# Patient Record
Sex: FEMALE | Race: WHITE | NOT HISPANIC OR LATINO | URBAN - METROPOLITAN AREA
[De-identification: names, ages, dates, MRNs, and addresses within clinical notes are randomized per-mention and may not be internally consistent; named-entity substitution may affect disease eponyms.]

---

## 2017-02-13 ENCOUNTER — OFFICE (OUTPATIENT)
Dept: URBAN - METROPOLITAN AREA CLINIC 19 | Facility: CLINIC | Age: 57
End: 2017-02-13

## 2017-02-13 VITALS
SYSTOLIC BLOOD PRESSURE: 124 MMHG | HEIGHT: 67 IN | WEIGHT: 167 LBS | DIASTOLIC BLOOD PRESSURE: 77 MMHG | HEART RATE: 87 BPM

## 2017-02-13 DIAGNOSIS — R10.32 LEFT LOWER QUADRANT PAIN: ICD-10-CM

## 2017-02-13 DIAGNOSIS — R13.10 DYSPHAGIA, UNSPECIFIED: ICD-10-CM

## 2017-02-13 DIAGNOSIS — R14.0 ABDOMINAL DISTENSION (GASEOUS): ICD-10-CM

## 2017-02-13 DIAGNOSIS — Z80.0 FAMILY HISTORY OF MALIGNANT NEOPLASM OF DIGESTIVE ORGANS: ICD-10-CM

## 2017-02-13 DIAGNOSIS — Z83.71 FAMILY HISTORY OF COLONIC POLYPS: ICD-10-CM

## 2017-02-13 DIAGNOSIS — E66.3 OVERWEIGHT: ICD-10-CM

## 2017-02-13 DIAGNOSIS — Z86.010 PERSONAL HISTORY OF COLONIC POLYPS: ICD-10-CM

## 2017-02-13 DIAGNOSIS — K59.00 CONSTIPATION, UNSPECIFIED: ICD-10-CM

## 2017-02-13 PROCEDURE — 99213 OFFICE O/P EST LOW 20 MIN: CPT | Performed by: INTERNAL MEDICINE

## 2017-02-13 RX ORDER — DOCUSATE SODIUM 100 MG/1
200 CAPSULE ORAL
Qty: 60 | Refills: 11 | Status: COMPLETED
Start: 2017-02-13 | End: 2018-04-06

## 2017-02-13 RX ORDER — LINACLOTIDE 290 UG/1
CAPSULE, GELATIN COATED ORAL
Qty: 90 | Refills: 5 | Status: ACTIVE

## 2017-02-13 NOTE — SERVICEHPINOTES
Patient presented to [facility] on 01/30/17 with severe, debilitating LLQ pain. She was treated with Dilaudid, evaluated and discharged with constipation diagnosis. KUB waas normal. Labs: CBC and CMP, normal. Glucose level: 140. She was discharged with Colace twice x7 days and asked to decrease to once per day after.  Her symptoms began 6-8 weeks ago with worsening episodes of LLQ pain and vomiting. Typically when she has had pain in the past it has always been LUQ of abdomen.  She has a long history of chronic constipation. Currently she is taking Colace once daily and Benefiber in her coffee each morning. Colace twice daily was effective. Miralax in past caused abd pain, increased gas, and diarrhea. Linzess in past did not work.  She has been experiencing dysphagia with pills and foods that tend to hesitate when going down. Feels like her throat is full an tends to happen prior to onset of LLQ pain and vomiting.   Denies any heartburn/reflux symptoms. BR

## 2017-03-30 ENCOUNTER — AMBULATORY SURGICAL CENTER (OUTPATIENT)
Dept: URBAN - METROPOLITAN AREA SURGERY 3 | Facility: SURGERY | Age: 57
End: 2017-03-30

## 2017-03-30 ENCOUNTER — OFFICE (OUTPATIENT)
Dept: URBAN - METROPOLITAN AREA CLINIC 11 | Facility: CLINIC | Age: 57
End: 2017-03-30

## 2017-03-30 VITALS
RESPIRATION RATE: 17 BRPM | OXYGEN SATURATION: 97 % | HEIGHT: 67 IN | RESPIRATION RATE: 22 BRPM | HEIGHT: 67 IN | SYSTOLIC BLOOD PRESSURE: 115 MMHG | SYSTOLIC BLOOD PRESSURE: 115 MMHG | HEART RATE: 62 BPM | DIASTOLIC BLOOD PRESSURE: 69 MMHG | DIASTOLIC BLOOD PRESSURE: 60 MMHG | DIASTOLIC BLOOD PRESSURE: 69 MMHG | RESPIRATION RATE: 20 BRPM | HEART RATE: 76 BPM | OXYGEN SATURATION: 98 % | SYSTOLIC BLOOD PRESSURE: 120 MMHG | DIASTOLIC BLOOD PRESSURE: 63 MMHG | HEART RATE: 76 BPM | RESPIRATION RATE: 20 BRPM | OXYGEN SATURATION: 97 % | OXYGEN SATURATION: 100 % | HEART RATE: 62 BPM | RESPIRATION RATE: 21 BRPM | TEMPERATURE: 98.1 F | SYSTOLIC BLOOD PRESSURE: 139 MMHG | SYSTOLIC BLOOD PRESSURE: 121 MMHG | TEMPERATURE: 97 F | SYSTOLIC BLOOD PRESSURE: 121 MMHG | DIASTOLIC BLOOD PRESSURE: 75 MMHG | TEMPERATURE: 98.1 F | DIASTOLIC BLOOD PRESSURE: 75 MMHG | RESPIRATION RATE: 22 BRPM | RESPIRATION RATE: 21 BRPM | HEART RATE: 64 BPM | DIASTOLIC BLOOD PRESSURE: 63 MMHG | SYSTOLIC BLOOD PRESSURE: 122 MMHG | SYSTOLIC BLOOD PRESSURE: 139 MMHG | RESPIRATION RATE: 17 BRPM | OXYGEN SATURATION: 98 % | DIASTOLIC BLOOD PRESSURE: 66 MMHG | SYSTOLIC BLOOD PRESSURE: 122 MMHG | HEART RATE: 64 BPM | TEMPERATURE: 97 F | DIASTOLIC BLOOD PRESSURE: 66 MMHG | DIASTOLIC BLOOD PRESSURE: 60 MMHG | WEIGHT: 170 LBS | WEIGHT: 170 LBS | SYSTOLIC BLOOD PRESSURE: 120 MMHG | OXYGEN SATURATION: 100 %

## 2017-03-30 DIAGNOSIS — K29.60 OTHER GASTRITIS WITHOUT BLEEDING: ICD-10-CM

## 2017-03-30 DIAGNOSIS — R13.10 DYSPHAGIA, UNSPECIFIED: ICD-10-CM

## 2017-03-30 PROBLEM — K31.89 OTHER DISEASES OF STOMACH AND DUODENUM: Status: ACTIVE | Noted: 2017-03-30

## 2017-03-30 PROCEDURE — 43239 EGD BIOPSY SINGLE/MULTIPLE: CPT | Performed by: INTERNAL MEDICINE

## 2017-03-30 PROCEDURE — 88305 TISSUE EXAM BY PATHOLOGIST: CPT | Performed by: INTERNAL MEDICINE

## 2017-03-30 PROCEDURE — 88313 SPECIAL STAINS GROUP 2: CPT | Performed by: INTERNAL MEDICINE

## 2017-03-30 PROCEDURE — 43248 EGD GUIDE WIRE INSERTION: CPT | Performed by: INTERNAL MEDICINE

## 2017-03-30 PROCEDURE — 88342 IMHCHEM/IMCYTCHM 1ST ANTB: CPT | Performed by: INTERNAL MEDICINE

## 2018-06-28 ENCOUNTER — AMBULATORY SURGICAL CENTER (OUTPATIENT)
Dept: URBAN - METROPOLITAN AREA SURGERY 3 | Facility: SURGERY | Age: 58
End: 2018-06-28
Payer: COMMERCIAL

## 2018-06-28 ENCOUNTER — OFFICE (OUTPATIENT)
Dept: URBAN - METROPOLITAN AREA PATHOLOGY 22 | Facility: PATHOLOGY | Age: 58
End: 2018-06-28
Payer: COMMERCIAL

## 2018-06-28 VITALS
OXYGEN SATURATION: 97 % | RESPIRATION RATE: 24 BRPM | HEIGHT: 67 IN | DIASTOLIC BLOOD PRESSURE: 73 MMHG | SYSTOLIC BLOOD PRESSURE: 118 MMHG | OXYGEN SATURATION: 97 % | HEART RATE: 64 BPM | DIASTOLIC BLOOD PRESSURE: 80 MMHG | RESPIRATION RATE: 19 BRPM | DIASTOLIC BLOOD PRESSURE: 73 MMHG | SYSTOLIC BLOOD PRESSURE: 118 MMHG | HEART RATE: 64 BPM | OXYGEN SATURATION: 96 % | RESPIRATION RATE: 18 BRPM | DIASTOLIC BLOOD PRESSURE: 80 MMHG | SYSTOLIC BLOOD PRESSURE: 122 MMHG | SYSTOLIC BLOOD PRESSURE: 117 MMHG | WEIGHT: 170 LBS | SYSTOLIC BLOOD PRESSURE: 122 MMHG | DIASTOLIC BLOOD PRESSURE: 65 MMHG | DIASTOLIC BLOOD PRESSURE: 74 MMHG | HEART RATE: 66 BPM | WEIGHT: 170 LBS | RESPIRATION RATE: 19 BRPM | HEART RATE: 69 BPM | SYSTOLIC BLOOD PRESSURE: 134 MMHG | TEMPERATURE: 96.3 F | TEMPERATURE: 98 F | TEMPERATURE: 98 F | SYSTOLIC BLOOD PRESSURE: 134 MMHG | DIASTOLIC BLOOD PRESSURE: 65 MMHG | HEART RATE: 69 BPM | HEART RATE: 66 BPM | RESPIRATION RATE: 18 BRPM | RESPIRATION RATE: 24 BRPM | TEMPERATURE: 96.3 F | HEIGHT: 67 IN | SYSTOLIC BLOOD PRESSURE: 117 MMHG | HEART RATE: 67 BPM | HEART RATE: 67 BPM | OXYGEN SATURATION: 96 % | DIASTOLIC BLOOD PRESSURE: 74 MMHG

## 2018-06-28 DIAGNOSIS — D12.2 BENIGN NEOPLASM OF ASCENDING COLON: ICD-10-CM

## 2018-06-28 DIAGNOSIS — K62.1 RECTAL POLYP: ICD-10-CM

## 2018-06-28 DIAGNOSIS — D12.3 BENIGN NEOPLASM OF TRANSVERSE COLON: ICD-10-CM

## 2018-06-28 DIAGNOSIS — D12.8 BENIGN NEOPLASM OF RECTUM: ICD-10-CM

## 2018-06-28 DIAGNOSIS — Z12.11 ENCOUNTER FOR SCREENING FOR MALIGNANT NEOPLASM OF COLON: ICD-10-CM

## 2018-06-28 DIAGNOSIS — Z83.71 FAMILY HISTORY OF COLONIC POLYPS: ICD-10-CM

## 2018-06-28 DIAGNOSIS — Z80.0 FAMILY HISTORY OF MALIGNANT NEOPLASM OF DIGESTIVE ORGANS: ICD-10-CM

## 2018-06-28 DIAGNOSIS — K64.8 OTHER HEMORRHOIDS: ICD-10-CM

## 2018-06-28 DIAGNOSIS — Z86.010 PERSONAL HISTORY OF COLONIC POLYPS: ICD-10-CM

## 2018-06-28 PROCEDURE — 45385 COLONOSCOPY W/LESION REMOVAL: CPT | Mod: 33 | Performed by: INTERNAL MEDICINE

## 2018-06-28 PROCEDURE — 45380 COLONOSCOPY AND BIOPSY: CPT | Mod: 59 | Performed by: INTERNAL MEDICINE

## 2018-06-28 PROCEDURE — 88305 TISSUE EXAM BY PATHOLOGIST: CPT | Performed by: INTERNAL MEDICINE

## 2018-06-28 PROCEDURE — 88342 IMHCHEM/IMCYTCHM 1ST ANTB: CPT | Performed by: INTERNAL MEDICINE

## 2019-02-11 ENCOUNTER — OFFICE (OUTPATIENT)
Dept: URBAN - METROPOLITAN AREA CLINIC 19 | Facility: CLINIC | Age: 59
End: 2019-02-11

## 2019-02-11 VITALS
SYSTOLIC BLOOD PRESSURE: 124 MMHG | WEIGHT: 183 LBS | DIASTOLIC BLOOD PRESSURE: 66 MMHG | HEIGHT: 67 IN | HEART RATE: 76 BPM

## 2019-02-11 DIAGNOSIS — R10.32 LEFT LOWER QUADRANT PAIN: ICD-10-CM

## 2019-02-11 DIAGNOSIS — K59.00 CONSTIPATION, UNSPECIFIED: ICD-10-CM

## 2019-02-11 LAB
COMP. METABOLIC PANEL (14): A/G RATIO: 1.9 (ref 1.2–2.2)
COMP. METABOLIC PANEL (14): ALBUMIN: 4.7 G/DL (ref 3.6–4.8)
COMP. METABOLIC PANEL (14): ALKALINE PHOSPHATASE: 107 IU/L (ref 39–117)
COMP. METABOLIC PANEL (14): ALT (SGPT): 26 IU/L (ref 0–32)
COMP. METABOLIC PANEL (14): AST (SGOT): 27 IU/L (ref 0–40)
COMP. METABOLIC PANEL (14): BILIRUBIN, TOTAL: <0.2 MG/DL
COMP. METABOLIC PANEL (14): BUN/CREATININE RATIO: 42 — HIGH (ref 12–28)
COMP. METABOLIC PANEL (14): BUN: 28 MG/DL — HIGH (ref 8–27)
COMP. METABOLIC PANEL (14): CALCIUM: 9.8 MG/DL (ref 8.7–10.3)
COMP. METABOLIC PANEL (14): CARBON DIOXIDE, TOTAL: 26 MMOL/L (ref 20–29)
COMP. METABOLIC PANEL (14): CHLORIDE: 103 MMOL/L (ref 96–106)
COMP. METABOLIC PANEL (14): CREATININE: 0.67 MG/DL (ref 0.57–1)
COMP. METABOLIC PANEL (14): EGFR IF AFRICN AM: 110 ML/MIN/1.73 (ref 59–?)
COMP. METABOLIC PANEL (14): EGFR IF NONAFRICN AM: 96 ML/MIN/1.73 (ref 59–?)
COMP. METABOLIC PANEL (14): GLOBULIN, TOTAL: 2.5 G/DL (ref 1.5–4.5)
COMP. METABOLIC PANEL (14): GLUCOSE: 90 MG/DL (ref 65–99)
COMP. METABOLIC PANEL (14): POTASSIUM: 5.1 MMOL/L (ref 3.5–5.2)
COMP. METABOLIC PANEL (14): PROTEIN, TOTAL: 7.2 G/DL (ref 6–8.5)
COMP. METABOLIC PANEL (14): SODIUM: 145 MMOL/L — HIGH (ref 134–144)

## 2019-02-11 PROCEDURE — 99213 OFFICE O/P EST LOW 20 MIN: CPT | Performed by: INTERNAL MEDICINE

## 2019-06-26 PROBLEM — Z86.010 SURVEILLANCE DUE TO PRIOR COLONIC NEOPLASIA: Status: ACTIVE | Noted: 2018-06-28

## 2019-07-18 ENCOUNTER — AMBULATORY SURGICAL CENTER (OUTPATIENT)
Dept: URBAN - METROPOLITAN AREA SURGERY 3 | Facility: SURGERY | Age: 59
End: 2019-07-18

## 2019-07-18 ENCOUNTER — OFFICE (OUTPATIENT)
Dept: URBAN - METROPOLITAN AREA PATHOLOGY 22 | Facility: PATHOLOGY | Age: 59
End: 2019-07-18

## 2019-07-18 VITALS
OXYGEN SATURATION: 94 % | HEART RATE: 57 BPM | OXYGEN SATURATION: 94 % | HEART RATE: 60 BPM | OXYGEN SATURATION: 95 % | TEMPERATURE: 97.2 F | SYSTOLIC BLOOD PRESSURE: 110 MMHG | SYSTOLIC BLOOD PRESSURE: 112 MMHG | SYSTOLIC BLOOD PRESSURE: 139 MMHG | OXYGEN SATURATION: 97 % | SYSTOLIC BLOOD PRESSURE: 106 MMHG | DIASTOLIC BLOOD PRESSURE: 60 MMHG | SYSTOLIC BLOOD PRESSURE: 139 MMHG | DIASTOLIC BLOOD PRESSURE: 83 MMHG | RESPIRATION RATE: 20 BRPM | OXYGEN SATURATION: 92 % | HEART RATE: 63 BPM | WEIGHT: 183 LBS | OXYGEN SATURATION: 95 % | HEART RATE: 63 BPM | RESPIRATION RATE: 19 BRPM | HEART RATE: 65 BPM | SYSTOLIC BLOOD PRESSURE: 112 MMHG | HEART RATE: 60 BPM | TEMPERATURE: 97.9 F | RESPIRATION RATE: 20 BRPM | SYSTOLIC BLOOD PRESSURE: 99 MMHG | DIASTOLIC BLOOD PRESSURE: 83 MMHG | SYSTOLIC BLOOD PRESSURE: 121 MMHG | DIASTOLIC BLOOD PRESSURE: 60 MMHG | OXYGEN SATURATION: 95 % | SYSTOLIC BLOOD PRESSURE: 110 MMHG | RESPIRATION RATE: 16 BRPM | SYSTOLIC BLOOD PRESSURE: 106 MMHG | OXYGEN SATURATION: 98 % | DIASTOLIC BLOOD PRESSURE: 49 MMHG | WEIGHT: 183 LBS | RESPIRATION RATE: 16 BRPM | HEART RATE: 61 BPM | SYSTOLIC BLOOD PRESSURE: 105 MMHG | DIASTOLIC BLOOD PRESSURE: 64 MMHG | TEMPERATURE: 97.2 F | SYSTOLIC BLOOD PRESSURE: 99 MMHG | HEART RATE: 61 BPM | HEART RATE: 65 BPM | OXYGEN SATURATION: 97 % | RESPIRATION RATE: 20 BRPM | WEIGHT: 183 LBS | OXYGEN SATURATION: 94 % | HEART RATE: 59 BPM | SYSTOLIC BLOOD PRESSURE: 112 MMHG | DIASTOLIC BLOOD PRESSURE: 49 MMHG | SYSTOLIC BLOOD PRESSURE: 106 MMHG | SYSTOLIC BLOOD PRESSURE: 121 MMHG | DIASTOLIC BLOOD PRESSURE: 72 MMHG | OXYGEN SATURATION: 98 % | DIASTOLIC BLOOD PRESSURE: 83 MMHG | HEART RATE: 57 BPM | SYSTOLIC BLOOD PRESSURE: 105 MMHG | DIASTOLIC BLOOD PRESSURE: 72 MMHG | OXYGEN SATURATION: 98 % | DIASTOLIC BLOOD PRESSURE: 72 MMHG | SYSTOLIC BLOOD PRESSURE: 121 MMHG | HEART RATE: 57 BPM | SYSTOLIC BLOOD PRESSURE: 110 MMHG | HEART RATE: 61 BPM | OXYGEN SATURATION: 97 % | SYSTOLIC BLOOD PRESSURE: 139 MMHG | TEMPERATURE: 97.9 F | DIASTOLIC BLOOD PRESSURE: 64 MMHG | RESPIRATION RATE: 18 BRPM | HEIGHT: 67 IN | RESPIRATION RATE: 16 BRPM | RESPIRATION RATE: 18 BRPM | HEIGHT: 67 IN | OXYGEN SATURATION: 96 % | RESPIRATION RATE: 18 BRPM | HEIGHT: 67 IN | DIASTOLIC BLOOD PRESSURE: 49 MMHG | RESPIRATION RATE: 19 BRPM | RESPIRATION RATE: 19 BRPM | OXYGEN SATURATION: 96 % | HEART RATE: 59 BPM | OXYGEN SATURATION: 96 % | OXYGEN SATURATION: 92 % | SYSTOLIC BLOOD PRESSURE: 105 MMHG | HEART RATE: 59 BPM | TEMPERATURE: 97.2 F | DIASTOLIC BLOOD PRESSURE: 60 MMHG | HEART RATE: 60 BPM | SYSTOLIC BLOOD PRESSURE: 99 MMHG | DIASTOLIC BLOOD PRESSURE: 64 MMHG | OXYGEN SATURATION: 92 % | TEMPERATURE: 97.9 F | HEART RATE: 63 BPM | HEART RATE: 65 BPM

## 2019-07-18 DIAGNOSIS — Z86.010 PERSONAL HISTORY OF COLONIC POLYPS: ICD-10-CM

## 2019-07-18 DIAGNOSIS — D12.4 BENIGN NEOPLASM OF DESCENDING COLON: ICD-10-CM

## 2019-07-18 DIAGNOSIS — K64.0 FIRST DEGREE HEMORRHOIDS: ICD-10-CM

## 2019-07-18 PROCEDURE — 45380 COLONOSCOPY AND BIOPSY: CPT | Performed by: INTERNAL MEDICINE

## 2019-07-18 PROCEDURE — 88305 TISSUE EXAM BY PATHOLOGIST: CPT | Performed by: INTERNAL MEDICINE

## 2019-11-01 ENCOUNTER — OFFICE (OUTPATIENT)
Dept: URBAN - METROPOLITAN AREA CLINIC 19 | Facility: CLINIC | Age: 59
End: 2019-11-01
Payer: COMMERCIAL

## 2019-11-01 VITALS
HEIGHT: 67 IN | HEART RATE: 75 BPM | DIASTOLIC BLOOD PRESSURE: 62 MMHG | SYSTOLIC BLOOD PRESSURE: 116 MMHG | WEIGHT: 184 LBS

## 2019-11-01 DIAGNOSIS — K59.00 CONSTIPATION, UNSPECIFIED: ICD-10-CM

## 2019-11-01 DIAGNOSIS — K64.8 OTHER HEMORRHOIDS: ICD-10-CM

## 2019-11-01 PROCEDURE — 99213 OFFICE O/P EST LOW 20 MIN: CPT | Performed by: INTERNAL MEDICINE

## 2020-05-11 ENCOUNTER — TELEHEALTH PROVIDED OTHER THAN IN PATIENT'S HOME (OUTPATIENT)
Dept: URBAN - METROPOLITAN AREA TELEHEALTH 10 | Facility: TELEHEALTH | Age: 60
End: 2020-05-11

## 2020-05-11 VITALS — HEIGHT: 67 IN

## 2020-05-11 DIAGNOSIS — K59.00 CONSTIPATION, UNSPECIFIED: ICD-10-CM

## 2020-05-11 NOTE — SERVICEHPINOTES
Patient is a 59 year old  woman who presents for telehealth follow-up.  She does not have any major complaints report at this time.  She reports that she is in a bit of a depression, as she lost her fiancé in January of this year.  Patient reports that she has been prescribed a folate to help with depression, and that no other antidepressants can be currently combined with the medications that she is on. Patient is doing well with Linzess 290 mcg daily, and feels this medication is working well for her.  She does not have any new GI symptoms report no new family history, or any new medical or surgical history.

## 2022-03-04 ENCOUNTER — OFFICE (OUTPATIENT)
Dept: URBAN - METROPOLITAN AREA CLINIC 19 | Facility: CLINIC | Age: 62
End: 2022-03-04

## 2022-03-04 VITALS
HEART RATE: 76 BPM | HEIGHT: 67 IN | DIASTOLIC BLOOD PRESSURE: 80 MMHG | SYSTOLIC BLOOD PRESSURE: 158 MMHG | RESPIRATION RATE: 18 BRPM | OXYGEN SATURATION: 100 % | WEIGHT: 184 LBS

## 2022-03-04 DIAGNOSIS — K59.00 CONSTIPATION, UNSPECIFIED: ICD-10-CM

## 2022-03-04 DIAGNOSIS — R13.10 DYSPHAGIA, UNSPECIFIED: ICD-10-CM

## 2022-03-04 DIAGNOSIS — R11.0 NAUSEA: ICD-10-CM

## 2022-03-04 DIAGNOSIS — R10.9 UNSPECIFIED ABDOMINAL PAIN: ICD-10-CM

## 2022-03-04 DIAGNOSIS — R14.2 ERUCTATION: ICD-10-CM

## 2022-03-04 PROCEDURE — 99214 OFFICE O/P EST MOD 30 MIN: CPT | Performed by: INTERNAL MEDICINE

## 2022-04-15 ENCOUNTER — AMBULATORY SURGICAL CENTER (OUTPATIENT)
Dept: URBAN - METROPOLITAN AREA SURGERY 3 | Facility: SURGERY | Age: 62
End: 2022-04-15

## 2022-04-15 ENCOUNTER — AMBULATORY SURGICAL CENTER (OUTPATIENT)
Dept: URBAN - METROPOLITAN AREA SURGERY 3 | Facility: SURGERY | Age: 62
End: 2022-04-15
Payer: COMMERCIAL

## 2022-04-15 ENCOUNTER — OFFICE (OUTPATIENT)
Dept: URBAN - METROPOLITAN AREA PATHOLOGY 22 | Facility: PATHOLOGY | Age: 62
End: 2022-04-15
Payer: COMMERCIAL

## 2022-04-15 VITALS
WEIGHT: 175 LBS | OXYGEN SATURATION: 95 % | TEMPERATURE: 96.6 F | SYSTOLIC BLOOD PRESSURE: 126 MMHG | WEIGHT: 175 LBS | HEART RATE: 71 BPM | OXYGEN SATURATION: 98 % | RESPIRATION RATE: 18 BRPM | DIASTOLIC BLOOD PRESSURE: 68 MMHG | OXYGEN SATURATION: 98 % | OXYGEN SATURATION: 95 % | SYSTOLIC BLOOD PRESSURE: 126 MMHG | RESPIRATION RATE: 20 BRPM | DIASTOLIC BLOOD PRESSURE: 80 MMHG | SYSTOLIC BLOOD PRESSURE: 120 MMHG | SYSTOLIC BLOOD PRESSURE: 116 MMHG | RESPIRATION RATE: 18 BRPM | HEART RATE: 79 BPM | HEIGHT: 67 IN | DIASTOLIC BLOOD PRESSURE: 80 MMHG | HEART RATE: 79 BPM | DIASTOLIC BLOOD PRESSURE: 60 MMHG | OXYGEN SATURATION: 95 % | DIASTOLIC BLOOD PRESSURE: 76 MMHG | TEMPERATURE: 98.2 F | SYSTOLIC BLOOD PRESSURE: 120 MMHG | OXYGEN SATURATION: 100 % | HEART RATE: 79 BPM | DIASTOLIC BLOOD PRESSURE: 70 MMHG | HEART RATE: 78 BPM | TEMPERATURE: 98.2 F | SYSTOLIC BLOOD PRESSURE: 126 MMHG | SYSTOLIC BLOOD PRESSURE: 114 MMHG | SYSTOLIC BLOOD PRESSURE: 114 MMHG | SYSTOLIC BLOOD PRESSURE: 116 MMHG | DIASTOLIC BLOOD PRESSURE: 76 MMHG | TEMPERATURE: 96.6 F | DIASTOLIC BLOOD PRESSURE: 76 MMHG | OXYGEN SATURATION: 97 % | SYSTOLIC BLOOD PRESSURE: 142 MMHG | DIASTOLIC BLOOD PRESSURE: 60 MMHG | SYSTOLIC BLOOD PRESSURE: 120 MMHG | DIASTOLIC BLOOD PRESSURE: 68 MMHG | HEART RATE: 69 BPM | RESPIRATION RATE: 16 BRPM | OXYGEN SATURATION: 97 % | RESPIRATION RATE: 18 BRPM | HEART RATE: 78 BPM | OXYGEN SATURATION: 97 % | SYSTOLIC BLOOD PRESSURE: 114 MMHG | RESPIRATION RATE: 20 BRPM | HEART RATE: 69 BPM | OXYGEN SATURATION: 100 % | DIASTOLIC BLOOD PRESSURE: 70 MMHG | OXYGEN SATURATION: 98 % | RESPIRATION RATE: 20 BRPM | TEMPERATURE: 96.6 F | HEART RATE: 69 BPM | HEART RATE: 71 BPM | DIASTOLIC BLOOD PRESSURE: 80 MMHG | HEART RATE: 78 BPM | RESPIRATION RATE: 16 BRPM | HEIGHT: 67 IN | DIASTOLIC BLOOD PRESSURE: 60 MMHG | WEIGHT: 175 LBS | SYSTOLIC BLOOD PRESSURE: 142 MMHG | DIASTOLIC BLOOD PRESSURE: 68 MMHG | DIASTOLIC BLOOD PRESSURE: 70 MMHG | HEIGHT: 67 IN | HEART RATE: 71 BPM | TEMPERATURE: 98.2 F | SYSTOLIC BLOOD PRESSURE: 116 MMHG | OXYGEN SATURATION: 100 % | RESPIRATION RATE: 16 BRPM | SYSTOLIC BLOOD PRESSURE: 142 MMHG

## 2022-04-15 DIAGNOSIS — Z86.010 PERSONAL HISTORY OF COLONIC POLYPS: ICD-10-CM

## 2022-04-15 DIAGNOSIS — K64.1 SECOND DEGREE HEMORRHOIDS: ICD-10-CM

## 2022-04-15 DIAGNOSIS — R13.14 DYSPHAGIA, PHARYNGOESOPHAGEAL PHASE: ICD-10-CM

## 2022-04-15 DIAGNOSIS — K29.50 UNSPECIFIED CHRONIC GASTRITIS WITHOUT BLEEDING: ICD-10-CM

## 2022-04-15 DIAGNOSIS — Z80.0 FAMILY HISTORY OF MALIGNANT NEOPLASM OF DIGESTIVE ORGANS: ICD-10-CM

## 2022-04-15 DIAGNOSIS — K31.89 OTHER DISEASES OF STOMACH AND DUODENUM: ICD-10-CM

## 2022-04-15 PROCEDURE — 43248 EGD GUIDE WIRE INSERTION: CPT | Performed by: INTERNAL MEDICINE

## 2022-04-15 PROCEDURE — 88342 IMHCHEM/IMCYTCHM 1ST ANTB: CPT | Performed by: STUDENT IN AN ORGANIZED HEALTH CARE EDUCATION/TRAINING PROGRAM

## 2022-04-15 PROCEDURE — 43239 EGD BIOPSY SINGLE/MULTIPLE: CPT | Mod: 51 | Performed by: INTERNAL MEDICINE

## 2022-04-15 PROCEDURE — 88305 TISSUE EXAM BY PATHOLOGIST: CPT | Performed by: STUDENT IN AN ORGANIZED HEALTH CARE EDUCATION/TRAINING PROGRAM

## 2022-04-15 PROCEDURE — G0105 COLORECTAL SCRN; HI RISK IND: HCPCS | Performed by: INTERNAL MEDICINE

## 2022-04-15 PROCEDURE — 43239 EGD BIOPSY SINGLE/MULTIPLE: CPT | Mod: 59 | Performed by: INTERNAL MEDICINE

## 2022-04-15 PROCEDURE — 88313 SPECIAL STAINS GROUP 2: CPT | Performed by: STUDENT IN AN ORGANIZED HEALTH CARE EDUCATION/TRAINING PROGRAM
